# Patient Record
Sex: FEMALE | Race: WHITE | Employment: UNEMPLOYED
[De-identification: names, ages, dates, MRNs, and addresses within clinical notes are randomized per-mention and may not be internally consistent; named-entity substitution may affect disease eponyms.]

---

## 2022-09-18 PROBLEM — E66.01 MORBID OBESITY (HCC): Status: ACTIVE | Noted: 2022-09-18

## 2022-09-18 PROBLEM — G43.909 MIGRAINE: Status: ACTIVE | Noted: 2022-09-18

## 2023-05-17 RX ORDER — ERGOCALCIFEROL 1.25 MG/1
50000 CAPSULE ORAL
COMMUNITY
Start: 2022-08-05 | End: 2023-06-01

## 2023-05-17 RX ORDER — BUSPIRONE HYDROCHLORIDE 15 MG/1
15 TABLET ORAL 3 TIMES DAILY
COMMUNITY
Start: 2022-10-25

## 2023-05-17 RX ORDER — CYCLOBENZAPRINE HCL 10 MG
10 TABLET ORAL 3 TIMES DAILY PRN
COMMUNITY
Start: 2022-08-25

## 2023-06-01 ENCOUNTER — TELEMEDICINE (OUTPATIENT)
Facility: CLINIC | Age: 43
End: 2023-06-01
Payer: COMMERCIAL

## 2023-06-01 DIAGNOSIS — E66.01 MORBID OBESITY (HCC): Primary | ICD-10-CM

## 2023-06-01 PROCEDURE — 99213 OFFICE O/P EST LOW 20 MIN: CPT | Performed by: NURSE PRACTITIONER

## 2023-06-01 RX ORDER — PHENTERMINE HYDROCHLORIDE 37.5 MG/1
37.5 CAPSULE ORAL EVERY MORNING
Qty: 30 CAPSULE | Refills: 0 | Status: SHIPPED | OUTPATIENT
Start: 2023-06-01 | End: 2023-07-01

## 2023-06-01 SDOH — ECONOMIC STABILITY: FOOD INSECURITY: WITHIN THE PAST 12 MONTHS, YOU WORRIED THAT YOUR FOOD WOULD RUN OUT BEFORE YOU GOT MONEY TO BUY MORE.: NEVER TRUE

## 2023-06-01 SDOH — ECONOMIC STABILITY: FOOD INSECURITY: WITHIN THE PAST 12 MONTHS, THE FOOD YOU BOUGHT JUST DIDN'T LAST AND YOU DIDN'T HAVE MONEY TO GET MORE.: NEVER TRUE

## 2023-06-01 SDOH — ECONOMIC STABILITY: TRANSPORTATION INSECURITY
IN THE PAST 12 MONTHS, HAS LACK OF TRANSPORTATION KEPT YOU FROM MEETINGS, WORK, OR FROM GETTING THINGS NEEDED FOR DAILY LIVING?: NO

## 2023-06-01 SDOH — ECONOMIC STABILITY: INCOME INSECURITY: HOW HARD IS IT FOR YOU TO PAY FOR THE VERY BASICS LIKE FOOD, HOUSING, MEDICAL CARE, AND HEATING?: NOT VERY HARD

## 2023-06-01 SDOH — ECONOMIC STABILITY: HOUSING INSECURITY
IN THE LAST 12 MONTHS, WAS THERE A TIME WHEN YOU DID NOT HAVE A STEADY PLACE TO SLEEP OR SLEPT IN A SHELTER (INCLUDING NOW)?: NO

## 2023-06-01 ASSESSMENT — ENCOUNTER SYMPTOMS
NAUSEA: 0
CHEST TIGHTNESS: 0
DIARRHEA: 0
CONSTIPATION: 0
ABDOMINAL PAIN: 0
COUGH: 0
WHEEZING: 0
SHORTNESS OF BREATH: 0
EYES NEGATIVE: 1
VOMITING: 0

## 2023-06-01 NOTE — PROGRESS NOTES
Kathie Orosco (:  1980) is a Established patient, presenting virtually for evaluation of the following:   Chief Complaint   Patient presents with    Weight Management        Assessment & Plan   Below is the assessment and plan developed based on review of pertinent history, physical exam, labs, studies, and medications. 1. Morbid obesity ([address]  Assessment & Plan:  3 pound weight loss in past 3 months with changes she has made on her own  Patient currently has cut out all sodas and sugary beverages along with eating appropriate portion sizes. Not eating fast food. Does sometimes miss meals and exercise not always consistent. Discussed continuing to drink mainly water and avoiding sugary beverages, ensure balanced plate with 3 meals per day and meal prepping. Increase exercise to 3-5 times weekly for 30 to 45 minutes consistently  Will start on phentermine 37.5 mg daily. Reviewed use and potential side effects. Pregnancy risk not applicable as she is status post tubal ligation. Encouraged to monitor weight and blood pressure daily  We will plan to reevaluate via virtual visit in 6 weeks or sooner if needed  Orders:  -     phentermine 37.5 MG capsule; Take 1 capsule by mouth every morning for 30 days. Max Daily Amount: 37.5 mg, Disp-30 capsule, R-0Normal    Return in about 6 weeks (around 2023), or or sooner if needed. Subjective   HPI  41 y/o female who presents via virtual visit to discuss weight loss medication. Has PMH of morbid obesity, anemia and migraines. States that over the past 3 to 4 months has been trying to work on weight reduction with cutting out sugary beverages and drinking water. Also has been eating reduced portions and carbohydrates. Is active around her home and does different aerobic exercises which usually last less than 30 minutes. Only noted a 3 to 4 pound weight loss since she began changes.   Was interested in taking [place] but states insurance would not

## 2023-06-01 NOTE — ASSESSMENT & PLAN NOTE
3 pound weight loss in past 3 months with changes she has made on her own  Patient currently has cut out all sodas and sugary beverages along with eating appropriate portion sizes. Not eating fast food. Does sometimes miss meals and exercise not always consistent. Discussed continuing to drink mainly water and avoiding sugary beverages, ensure balanced plate with 3 meals per day and meal prepping. Increase exercise to 3-5 times weekly for 30 to 45 minutes consistently  Will start on phentermine 37.5 mg daily. Reviewed use and potential side effects. Pregnancy risk not applicable as she is status post tubal ligation.   Encouraged to monitor weight and blood pressure daily  We will plan to reevaluate via virtual visit in 6 weeks or sooner if needed

## 2023-06-28 ENCOUNTER — PATIENT MESSAGE (OUTPATIENT)
Facility: CLINIC | Age: 43
End: 2023-06-28

## 2023-06-28 DIAGNOSIS — E66.01 MORBID OBESITY (HCC): ICD-10-CM

## 2023-06-29 RX ORDER — PHENTERMINE HYDROCHLORIDE 37.5 MG/1
37.5 CAPSULE ORAL EVERY MORNING
Qty: 30 CAPSULE | Refills: 1 | Status: SHIPPED | OUTPATIENT
Start: 2023-06-29 | End: 2023-07-29

## 2023-09-07 DIAGNOSIS — E66.01 MORBID OBESITY (HCC): ICD-10-CM

## 2023-09-07 RX ORDER — PHENTERMINE HYDROCHLORIDE 37.5 MG/1
CAPSULE ORAL
Qty: 30 CAPSULE | Refills: 0 | OUTPATIENT
Start: 2023-09-07